# Patient Record
Sex: FEMALE | Race: BLACK OR AFRICAN AMERICAN | NOT HISPANIC OR LATINO
[De-identification: names, ages, dates, MRNs, and addresses within clinical notes are randomized per-mention and may not be internally consistent; named-entity substitution may affect disease eponyms.]

---

## 2022-04-07 PROBLEM — Z00.00 ENCOUNTER FOR PREVENTIVE HEALTH EXAMINATION: Status: ACTIVE | Noted: 2022-04-07

## 2022-06-14 ENCOUNTER — APPOINTMENT (OUTPATIENT)
Dept: CARDIOLOGY | Facility: CLINIC | Age: 67
End: 2022-06-14

## 2022-09-12 ENCOUNTER — APPOINTMENT (OUTPATIENT)
Dept: CARDIOLOGY | Facility: CLINIC | Age: 67
End: 2022-09-12

## 2022-09-12 ENCOUNTER — APPOINTMENT (OUTPATIENT)
Dept: ENDOCRINOLOGY | Facility: CLINIC | Age: 67
End: 2022-09-12

## 2022-09-12 ENCOUNTER — NON-APPOINTMENT (OUTPATIENT)
Age: 67
End: 2022-09-12

## 2022-09-12 VITALS
OXYGEN SATURATION: 98 % | HEIGHT: 60 IN | TEMPERATURE: 98.1 F | DIASTOLIC BLOOD PRESSURE: 80 MMHG | WEIGHT: 119 LBS | BODY MASS INDEX: 23.36 KG/M2 | SYSTOLIC BLOOD PRESSURE: 123 MMHG | HEART RATE: 68 BPM

## 2022-09-12 VITALS
OXYGEN SATURATION: 98 % | HEIGHT: 60 IN | SYSTOLIC BLOOD PRESSURE: 123 MMHG | WEIGHT: 119 LBS | BODY MASS INDEX: 23.36 KG/M2 | DIASTOLIC BLOOD PRESSURE: 80 MMHG | HEART RATE: 68 BPM

## 2022-09-12 DIAGNOSIS — E04.1 NONTOXIC SINGLE THYROID NODULE: ICD-10-CM

## 2022-09-12 DIAGNOSIS — E78.5 HYPERLIPIDEMIA, UNSPECIFIED: ICD-10-CM

## 2022-09-12 DIAGNOSIS — Z23 ENCOUNTER FOR IMMUNIZATION: ICD-10-CM

## 2022-09-12 DIAGNOSIS — R00.1 BRADYCARDIA, UNSPECIFIED: ICD-10-CM

## 2022-09-12 DIAGNOSIS — Z13.228 ENCOUNTER FOR SCREENING FOR OTHER METABOLIC DISORDERS: ICD-10-CM

## 2022-09-12 DIAGNOSIS — S82.009A UNSPECIFIED FRACTURE OF UNSPECIFIED PATELLA, INITIAL ENCOUNTER FOR CLOSED FRACTURE: ICD-10-CM

## 2022-09-12 DIAGNOSIS — I10 ESSENTIAL (PRIMARY) HYPERTENSION: ICD-10-CM

## 2022-09-12 DIAGNOSIS — M81.0 AGE-RELATED OSTEOPOROSIS W/OUT CURRENT PATHOLOGICAL FRACTURE: ICD-10-CM

## 2022-09-12 DIAGNOSIS — Z71.85 ENCOUNTER FOR IMMUNIZATION SAFETY COUNSELING: ICD-10-CM

## 2022-09-12 PROCEDURE — 99204 OFFICE O/P NEW MOD 45 MIN: CPT

## 2022-09-12 PROCEDURE — 90662 IIV NO PRSV INCREASED AG IM: CPT

## 2022-09-12 PROCEDURE — 36415 COLL VENOUS BLD VENIPUNCTURE: CPT

## 2022-09-12 PROCEDURE — G0008: CPT

## 2022-09-12 PROCEDURE — 93000 ELECTROCARDIOGRAM COMPLETE: CPT

## 2022-09-12 PROCEDURE — 99204 OFFICE O/P NEW MOD 45 MIN: CPT | Mod: 25

## 2022-09-12 NOTE — HISTORY OF PRESENT ILLNESS
[FreeTextEntry1] : Ms. ECHOLS is a 66 year old female who presents for initial endocrine evaluation. She presents with regard to a history of osteoporosis. She used to see Dr. Scottie Cho.\par She has broken her left knee 4 years ago.\par \par She is currently on Alendronate 70 mg weekly for the Osteoporosis. She was on Boniva in the past.\par Latest dexa scan from 03/21/2022 did show:\par T score spine -2.7 previously -2.8\par T score femoral neck left -3.1\par T score total hip -2.4 previously -2.5\par Compared to prior spine 1.3 %\par                               Hip 0.8 % \par \par Menopause\par \par Patient does report of thyroid nodularity but report is not available at this time.\par Additional medical history includes that of hyperlipidemia and hypertension\par Meds: Atenolol 25 mg, ASA, Simvastatin 20 mg\par She has been taking calcium. No other OTC.\par \par SHx: Left Patella repair 4 years ago.\par FHx: Father, Mother: HTN, Heart disease.\par Social Hx: No tobacco. Drinks 3 glasses of wine a week, marijuana use.\par \par PCP: Dr. Zac Yarbrough

## 2022-09-12 NOTE — HISTORY OF PRESENT ILLNESS
[FreeTextEntry1] : This is a 65 y/o female with a pmhx of HTN, HLD here today to establish cardiac care. Patient denies chest pain, dyspnea, palpitations, dizziness, syncope, changes in bowel/bladder habits or appetite.

## 2022-09-13 ENCOUNTER — TRANSCRIPTION ENCOUNTER (OUTPATIENT)
Age: 67
End: 2022-09-13

## 2022-09-13 LAB
25(OH)D3 SERPL-MCNC: 47.6 NG/ML
ALBUMIN SERPL ELPH-MCNC: 4.5 G/DL
ALP BLD-CCNC: 42 U/L
ALT SERPL-CCNC: 13 U/L
ANION GAP SERPL CALC-SCNC: 10 MMOL/L
AST SERPL-CCNC: 18 U/L
BILIRUB SERPL-MCNC: 0.4 MG/DL
BUN SERPL-MCNC: 16 MG/DL
CALCIUM SERPL-MCNC: 9.9 MG/DL
CALCIUM SERPL-MCNC: 9.9 MG/DL
CHLORIDE SERPL-SCNC: 105 MMOL/L
CO2 SERPL-SCNC: 28 MMOL/L
CREAT SERPL-MCNC: 0.76 MG/DL
EGFR: 86 ML/MIN/1.73M2
GLUCOSE SERPL-MCNC: 89 MG/DL
MAGNESIUM SERPL-MCNC: 2.1 MG/DL
PARATHYROID HORMONE INTACT: 36 PG/ML
PHOSPHATE SERPL-MCNC: 4.7 MG/DL
POTASSIUM SERPL-SCNC: 4.3 MMOL/L
PROT SERPL-MCNC: 6.6 G/DL
SODIUM SERPL-SCNC: 142 MMOL/L
T3FREE SERPL-MCNC: 2.94 PG/ML
T4 FREE SERPL-MCNC: 1.2 NG/DL
TSH SERPL-ACNC: 2.57 UIU/ML

## 2022-09-15 LAB — ALP BONE SERPL-MCNC: 7.7 UG/L

## 2022-09-16 LAB — OSTEOCALCIN SERPL-MCNC: 5.1 NG/ML

## 2022-09-19 LAB — COLLAGEN CTX SERPL-MCNC: 155 PG/ML

## 2022-09-20 ENCOUNTER — OUTPATIENT (OUTPATIENT)
Dept: OUTPATIENT SERVICES | Facility: HOSPITAL | Age: 67
LOS: 1 days | End: 2022-09-20
Payer: SELF-PAY

## 2022-09-20 ENCOUNTER — APPOINTMENT (OUTPATIENT)
Dept: CT IMAGING | Facility: CLINIC | Age: 67
End: 2022-09-20

## 2022-09-20 DIAGNOSIS — E78.5 HYPERLIPIDEMIA, UNSPECIFIED: ICD-10-CM

## 2022-09-20 PROCEDURE — 75571 CT HRT W/O DYE W/CA TEST: CPT | Mod: 26

## 2022-09-20 PROCEDURE — 75571 CT HRT W/O DYE W/CA TEST: CPT

## 2022-09-22 ENCOUNTER — NON-APPOINTMENT (OUTPATIENT)
Age: 67
End: 2022-09-22

## 2022-09-22 LAB
APPEARANCE: CLEAR
BACTERIA: NEGATIVE
BASOPHILS # BLD AUTO: 0.04 K/UL
BASOPHILS NFR BLD AUTO: 0.6 %
BILIRUBIN URINE: NEGATIVE
BLOOD URINE: NORMAL
CHOLEST SERPL-MCNC: 194 MG/DL
CK SERPL-CCNC: 85 U/L
COLOR: COLORLESS
EOSINOPHIL # BLD AUTO: 0.19 K/UL
EOSINOPHIL NFR BLD AUTO: 3 %
ESTIMATED AVERAGE GLUCOSE: 114 MG/DL
GLUCOSE QUALITATIVE U: NEGATIVE
HBA1C MFR BLD HPLC: 5.6 %
HCT VFR BLD CALC: 43.2 %
HDLC SERPL-MCNC: 62 MG/DL
HGB BLD-MCNC: 14.2 G/DL
HYALINE CASTS: 0 /LPF
IMM GRANULOCYTES NFR BLD AUTO: 0.2 %
KETONES URINE: NEGATIVE
LDLC SERPL CALC-MCNC: 113 MG/DL
LEUKOCYTE ESTERASE URINE: ABNORMAL
LYMPHOCYTES # BLD AUTO: 1.41 K/UL
LYMPHOCYTES NFR BLD AUTO: 22.1 %
MAN DIFF?: NORMAL
MCHC RBC-ENTMCNC: 29.8 PG
MCHC RBC-ENTMCNC: 32.9 GM/DL
MCV RBC AUTO: 90.6 FL
MICROSCOPIC-UA: NORMAL
MONOCYTES # BLD AUTO: 0.39 K/UL
MONOCYTES NFR BLD AUTO: 6.1 %
NEUTROPHILS # BLD AUTO: 4.34 K/UL
NEUTROPHILS NFR BLD AUTO: 68 %
NITRITE URINE: NEGATIVE
NONHDLC SERPL-MCNC: 132 MG/DL
PH URINE: 6
PLATELET # BLD AUTO: 242 K/UL
PROTEIN URINE: NEGATIVE
RBC # BLD: 4.77 M/UL
RBC # FLD: 12.6 %
RED BLOOD CELLS URINE: 0 /HPF
SPECIFIC GRAVITY URINE: 1.01
SQUAMOUS EPITHELIAL CELLS: 1 /HPF
TRIGL SERPL-MCNC: 95 MG/DL
UROBILINOGEN URINE: NORMAL
WBC # FLD AUTO: 6.38 K/UL
WHITE BLOOD CELLS URINE: 0 /HPF

## 2022-10-14 ENCOUNTER — NON-APPOINTMENT (OUTPATIENT)
Age: 67
End: 2022-10-14

## 2022-10-14 DIAGNOSIS — R93.89 ABNORMAL FINDINGS ON DIAGNOSTIC IMAGING OF OTHER SPECIFIED BODY STRUCTURES: ICD-10-CM

## 2022-10-17 DIAGNOSIS — J98.6 DISORDERS OF DIAPHRAGM: ICD-10-CM

## 2022-10-21 ENCOUNTER — NON-APPOINTMENT (OUTPATIENT)
Age: 67
End: 2022-10-21

## 2022-11-14 ENCOUNTER — OUTPATIENT (OUTPATIENT)
Dept: OUTPATIENT SERVICES | Facility: HOSPITAL | Age: 67
LOS: 1 days | End: 2022-11-14
Payer: MEDICARE

## 2022-11-14 ENCOUNTER — APPOINTMENT (OUTPATIENT)
Dept: ULTRASOUND IMAGING | Facility: CLINIC | Age: 67
End: 2022-11-14

## 2022-11-14 DIAGNOSIS — R93.89 ABNORMAL FINDINGS ON DIAGNOSTIC IMAGING OF OTHER SPECIFIED BODY STRUCTURES: ICD-10-CM

## 2022-11-14 PROCEDURE — 76700 US EXAM ABDOM COMPLETE: CPT

## 2022-11-14 PROCEDURE — 76700 US EXAM ABDOM COMPLETE: CPT | Mod: 26
